# Patient Record
Sex: FEMALE | Race: WHITE | URBAN - METROPOLITAN AREA
[De-identification: names, ages, dates, MRNs, and addresses within clinical notes are randomized per-mention and may not be internally consistent; named-entity substitution may affect disease eponyms.]

---

## 2021-03-31 ENCOUNTER — OFFICE VISIT (OUTPATIENT)
Dept: URBAN - METROPOLITAN AREA CLINIC 16 | Facility: CLINIC | Age: 41
End: 2021-03-31
Payer: COMMERCIAL

## 2021-03-31 DIAGNOSIS — Z01.00 ENCOUNTER FOR EXAM OF EYE: Primary | ICD-10-CM

## 2021-03-31 PROCEDURE — 92002 INTRM OPH EXAM NEW PATIENT: CPT | Performed by: OPTOMETRIST

## 2021-03-31 ASSESSMENT — VISUAL ACUITY
OD: 20/20
OS: 20/20

## 2021-03-31 ASSESSMENT — INTRAOCULAR PRESSURE
OS: 16
OD: 16

## 2021-03-31 ASSESSMENT — KERATOMETRY: OS: 44.88

## 2021-03-31 NOTE — IMPRESSION/PLAN
Impression: Encounter for exam of eye: Z01.00. Plan: Discussed diagnosis in detail with patient. New glasses Rx was not given today.

## 2022-08-09 ENCOUNTER — OFFICE VISIT (OUTPATIENT)
Dept: URBAN - METROPOLITAN AREA CLINIC 16 | Facility: CLINIC | Age: 42
End: 2022-08-09
Payer: COMMERCIAL

## 2022-08-09 DIAGNOSIS — Z98.890 OTHER SPECIFIED POSTPROCEDURAL STATES: ICD-10-CM

## 2022-08-09 DIAGNOSIS — H52.223 REGULAR ASTIGMATISM, BILATERAL: Primary | ICD-10-CM

## 2022-08-09 PROCEDURE — 92014 COMPRE OPH EXAM EST PT 1/>: CPT | Performed by: OPTOMETRIST

## 2022-08-09 ASSESSMENT — VISUAL ACUITY
OD: 20/20
OS: 20/20

## 2022-08-09 ASSESSMENT — INTRAOCULAR PRESSURE
OS: 16
OD: 16

## 2022-08-09 NOTE — IMPRESSION/PLAN
Impression: Other specified postprocedural states: Z98.890. Plan: Condition is stable OU. No signs of pathology today. RTC 1 year x CFM.